# Patient Record
Sex: FEMALE | Race: WHITE | NOT HISPANIC OR LATINO | URBAN - METROPOLITAN AREA
[De-identification: names, ages, dates, MRNs, and addresses within clinical notes are randomized per-mention and may not be internally consistent; named-entity substitution may affect disease eponyms.]

---

## 2017-01-02 NOTE — PATIENT DISCUSSION
POAG, OU:  ELEVATED INTRAOCULAR PRESSURE. PRESCRIBE __LATANOPROST QHS OU_______. RETURN FOR FOLLOW-UP AS SCHEDULED.

## 2017-01-02 NOTE — PATIENT DISCUSSION
Primary Open Angle Glaucoma Counseling:  I have explained to the patient at length regarding the diagnosis of primary open angle glaucoma and its pathophysiology. I have discussed the various treatment options including medications and surgery. I recommend that the patient begin medical treatment. I emphasized to the patient the importance of compliance with treatment and follow-up appointments.

## 2017-04-27 ENCOUNTER — IMPORTED ENCOUNTER (OUTPATIENT)
Dept: URBAN - METROPOLITAN AREA CLINIC 9 | Facility: CLINIC | Age: 67
End: 2017-04-27

## 2017-08-29 ENCOUNTER — IMPORTED ENCOUNTER (OUTPATIENT)
Dept: URBAN - METROPOLITAN AREA CLINIC 9 | Facility: CLINIC | Age: 67
End: 2017-08-29

## 2018-03-06 ENCOUNTER — IMPORTED ENCOUNTER (OUTPATIENT)
Dept: URBAN - METROPOLITAN AREA CLINIC 9 | Facility: CLINIC | Age: 68
End: 2018-03-06

## 2018-08-07 ENCOUNTER — IMPORTED ENCOUNTER (OUTPATIENT)
Dept: URBAN - METROPOLITAN AREA CLINIC 9 | Facility: CLINIC | Age: 68
End: 2018-08-07

## 2018-09-20 NOTE — PATIENT DISCUSSION
At this point, I don't recommend any surgery for the removal of the floaters since this is her better seeing eye.

## 2018-09-21 ENCOUNTER — IMPORTED ENCOUNTER (OUTPATIENT)
Dept: URBAN - METROPOLITAN AREA CLINIC 9 | Facility: CLINIC | Age: 68
End: 2018-09-21

## 2019-03-22 ENCOUNTER — IMPORTED ENCOUNTER (OUTPATIENT)
Dept: URBAN - METROPOLITAN AREA CLINIC 9 | Facility: CLINIC | Age: 69
End: 2019-03-22

## 2019-04-08 ENCOUNTER — IMPORTED ENCOUNTER (OUTPATIENT)
Dept: URBAN - METROPOLITAN AREA CLINIC 9 | Facility: CLINIC | Age: 69
End: 2019-04-08

## 2019-04-26 ENCOUNTER — IMPORTED ENCOUNTER (OUTPATIENT)
Dept: URBAN - METROPOLITAN AREA CLINIC 9 | Facility: CLINIC | Age: 69
End: 2019-04-26

## 2019-05-06 ENCOUNTER — IMPORTED ENCOUNTER (OUTPATIENT)
Dept: URBAN - METROPOLITAN AREA CLINIC 9 | Facility: CLINIC | Age: 69
End: 2019-05-06

## 2019-05-10 ENCOUNTER — IMPORTED ENCOUNTER (OUTPATIENT)
Dept: URBAN - METROPOLITAN AREA CLINIC 9 | Facility: CLINIC | Age: 69
End: 2019-05-10

## 2019-05-22 ENCOUNTER — IMPORTED ENCOUNTER (OUTPATIENT)
Dept: URBAN - METROPOLITAN AREA CLINIC 9 | Facility: CLINIC | Age: 69
End: 2019-05-22

## 2019-08-14 ENCOUNTER — IMPORTED ENCOUNTER (OUTPATIENT)
Dept: URBAN - METROPOLITAN AREA CLINIC 9 | Facility: CLINIC | Age: 69
End: 2019-08-14

## 2019-12-26 ENCOUNTER — OFFICE VISIT (OUTPATIENT)
Dept: URGENT CARE | Facility: MEDICAL CENTER | Age: 69
End: 2019-12-26
Payer: MEDICARE

## 2019-12-26 VITALS
OXYGEN SATURATION: 96 % | TEMPERATURE: 100.6 F | BODY MASS INDEX: 27.09 KG/M2 | DIASTOLIC BLOOD PRESSURE: 72 MMHG | HEIGHT: 67 IN | RESPIRATION RATE: 22 BRPM | WEIGHT: 172.62 LBS | SYSTOLIC BLOOD PRESSURE: 157 MMHG | HEART RATE: 92 BPM

## 2019-12-26 DIAGNOSIS — J44.1 ACUTE EXACERBATION OF CHRONIC OBSTRUCTIVE PULMONARY DISEASE (COPD) (HCC): ICD-10-CM

## 2019-12-26 DIAGNOSIS — J06.9 ACUTE URI: Primary | ICD-10-CM

## 2019-12-26 PROCEDURE — 99214 OFFICE O/P EST MOD 30 MIN: CPT | Performed by: FAMILY MEDICINE

## 2019-12-26 PROCEDURE — G0463 HOSPITAL OUTPT CLINIC VISIT: HCPCS | Performed by: FAMILY MEDICINE

## 2019-12-26 RX ORDER — OMEPRAZOLE 40 MG/1
40 CAPSULE, DELAYED RELEASE ORAL DAILY
COMMUNITY

## 2019-12-26 RX ORDER — LEVOTHYROXINE SODIUM 88 UG/1
CAPSULE ORAL
COMMUNITY

## 2019-12-26 RX ORDER — GABAPENTIN 100 MG/1
100 CAPSULE ORAL 3 TIMES DAILY
COMMUNITY

## 2019-12-26 RX ORDER — BENZONATATE 200 MG/1
200 CAPSULE ORAL 3 TIMES DAILY PRN
Qty: 20 CAPSULE | Refills: 0 | Status: SHIPPED | OUTPATIENT
Start: 2019-12-26

## 2019-12-26 RX ORDER — ASCORBIC ACID 250 MG
TABLET,CHEWABLE ORAL
COMMUNITY

## 2019-12-26 RX ORDER — MELOXICAM 7.5 MG/1
7.5 TABLET ORAL DAILY
COMMUNITY

## 2019-12-26 RX ORDER — BUDESONIDE AND FORMOTEROL FUMARATE DIHYDRATE 160; 4.5 UG/1; UG/1
2 AEROSOL RESPIRATORY (INHALATION) 2 TIMES DAILY
COMMUNITY

## 2019-12-26 RX ORDER — PREDNISONE 20 MG/1
TABLET ORAL
Qty: 18 TABLET | Refills: 0 | Status: SHIPPED | OUTPATIENT
Start: 2019-12-26

## 2019-12-26 RX ORDER — CHOLECALCIFEROL (VITAMIN D3) 125 MCG
2000 CAPSULE ORAL DAILY
COMMUNITY

## 2019-12-26 RX ORDER — LORATADINE 10 MG/1
CAPSULE, LIQUID FILLED ORAL
COMMUNITY

## 2019-12-26 RX ORDER — CHOLECALCIFEROL (VITAMIN D3) 125 MCG
500 CAPSULE ORAL DAILY
COMMUNITY

## 2019-12-26 NOTE — PROGRESS NOTES
[facility]        NAME: Shannon Gomes is a 71 y o  female  : 1950    MRN: [id number]  DATE: 2019  TIME: 5:22 PM    Assessment and Plan   Acute URI [J06 9]  1  Acute URI  benzonatate (TESSALON) 200 MG capsule    predniSONE (DELTASONE) 20 mg tablet   2  Acute exacerbation of chronic obstructive pulmonary disease (COPD) (HCC)  benzonatate (TESSALON) 200 MG capsule    predniSONE (DELTASONE) 20 mg tablet         Patient Instructions       Follow up with PCP in 3-5 days  Proceed to  ER if symptoms worsen  Chief Complaint     Chief Complaint   Patient presents with    Cough     c/o coughing and chills since yesterday  Per Pt "I have COPD so it takes everything out of me "         History of Present Illness       51-year-old female here today with 1 day history URI symptoms  Symptoms consist of mild nasal congestion  Denies postnasal drip  Refers to scratchy throat which has improved  Her cough is predominant nonproductive but complaining shortness of breath and wheezing  She has a known history of COPD and currently uses Symbicort inhaler twice a day, has recently started using her Combivent rescue inhaler  For cold symptoms she has started using Mucinex day and nighttime relief along with NyQuil this morning  Symptoms seem slightly better  Denies any fever chills that she is aware of  Review of Systems   Review of Systems   Constitutional: Negative  HENT: Positive for congestion and sore throat  Respiratory: Positive for cough and shortness of breath  Neurological: Positive for headaches           Current Medications       Current Outpatient Medications:     Ascorbic Acid (VITAMIN C) 250 MG CHEW, Chew, Disp: , Rfl:     budesonide-formoterol (SYMBICORT) 160-4 5 mcg/act inhaler, Inhale 2 puffs 2 (two) times a day Rinse mouth after use , Disp: , Rfl:     Calcium-Magnesium-Vitamin D (CALCIUM 500 PO), Take by mouth, Disp: , Rfl:     Cholecalciferol (VITAMIN D3) 50 MCG (2000 UT) TABS, Take 2,000 Units by mouth daily, Disp: , Rfl:     ESCITALOPRAM OXALATE PO, Take by mouth, Disp: , Rfl:     FIBER SELECT GUMMIES PO, Take by mouth, Disp: , Rfl:     gabapentin (NEURONTIN) 100 mg capsule, Take 100 mg by mouth 3 (three) times a day, Disp: , Rfl:     ipratropium-albuterol (COMBIVENT RESPIMAT) inhaler, Inhale 1 puff 4 (four) times a day, Disp: , Rfl:     Levothyroxine Sodium 88 MCG CAPS, Take by mouth, Disp: , Rfl:     Loratadine 10 MG CAPS, Take by mouth, Disp: , Rfl:     meloxicam (MOBIC) 7 5 mg tablet, Take 7 5 mg by mouth daily, Disp: , Rfl:     Multiple Vitamins-Minerals (CENTRUM ADULTS PO), Take by mouth, Disp: , Rfl:     omeprazole (PriLOSEC) 40 MG capsule, Take 40 mg by mouth daily, Disp: , Rfl:     vitamin B-12 (VITAMIN B-12) 500 mcg tablet, Take 500 mcg by mouth daily, Disp: , Rfl:     benzonatate (TESSALON) 200 MG capsule, Take 1 capsule (200 mg total) by mouth 3 (three) times a day as needed for cough, Disp: 20 capsule, Rfl: 0    predniSONE (DELTASONE) 20 mg tablet, Take 3 tablets for 3 days then 2 tablets for 3 days then 1 tablet for 3 days then stop, Disp: 18 tablet, Rfl: 0    Current Allergies     Allergies as of 12/26/2019    (No Known Allergies)            The following portions of the patient's history were reviewed and updated as appropriate: allergies, current medications, past family history, past medical history, past social history, past surgical history and problem list      Past Medical History:   Diagnosis Date    Anxiety     Diverticulitis     GERD (gastroesophageal reflux disease)        Past Surgical History:   Procedure Laterality Date    COLON SURGERY      LUNG SURGERY         No family history on file  Medications have been verified          Objective   /72 (BP Location: Left arm, Patient Position: Sitting, Cuff Size: Extra-Large)   Pulse 92   Temp (!) 100 6 °F (38 1 °C) (Tympanic)   Resp 22   Ht 5' 7" (1 702 m)   Wt 78 3 kg (172 lb 9 9 oz)   SpO2 96%   BMI 27 04 kg/m²        Physical Exam     Physical Exam   HENT:   Mouth/Throat: Oropharynx is clear and moist    Erythematous hypertrophic left turbinates  Neck: Normal range of motion  Cardiovascular: Normal rate, regular rhythm and normal heart sounds  Pulmonary/Chest: Effort normal and breath sounds normal    Nursing note and vitals reviewed

## 2019-12-26 NOTE — PATIENT INSTRUCTIONS
I prescribed Tessalon Perles 200 mg q 8 hours, prednisone tapering from 60 down to 20 mg over 9 days  Encourage patient to continue Symbicort twice a day, use Combivent as needed  Recommended over-the-counter plain Mucinex for expectoration  Follow up with PCP symptoms persist or worsen  COPD, Ambulatory Care   GENERAL INFORMATION:   COPD (chronic obstructive pulmonary disease)  is a lung disease that makes it hard for you to breathe  COPD is usually a result of lung damage caused by years of irritation and inflammation  COPD limits air flow in your lungs  Smoking, pollution, genetics, or a history of lung infections can increase your risk for COPD  Common symptoms include the following:   · Shortness of breath     · A dry cough     · Coughing fits that bring up mucus from your lungs     · Wheezing and chest tightness  Seek immediate care for the following symptoms:   · Confusion, dizziness, or lightheadedness    · Red, swollen, warm arm or leg    · Shortness of breath or chest pain    · Coughing up blood  Treatment for COPD  may include medicines to help decrease swelling and inflammation in your lungs  Medicines may also help open your airways or treat and infection  You may need pulmonary rehabilitation to help you manage your symptoms and improve your quality of life  You may need extra oxygen to help you breathe easier  Manage COPD and prevent an exacerbation:   · Do not smoke, and avoid others who smoke  If you smoke, it is never too late to quit  You may have fewer exacerbations  Ask for information about medicines and support programs that can help you quit  · Avoid triggers that make your symptoms worse  Cold weather and sudden temperature changes can trigger an exacerbation  Fumes from cars and chemicals, air pollution, and perfume can also increase your symptoms  · Use pursed-lip breathing when you feel short of breath  Take a deep breath in through your nose   Slowly breathe out through your mouth with your lips pursed for twice as long as you inhaled  You can also practice this breathing pattern while you bend, lift, climb stairs, or exercise  Pursed-lip breathing slows down your breathing and helps move more air in and out of your lungs  · Exercise for at least 20 minutes each day  Exercise can help increase your energy and decrease shortness of breath  Ask about the best exercise plan for you  · Prevent infections that can be dangerous when you have COPD  Get a flu vaccine every year as soon as it becomes available  Ask if you should also get other vaccines, such as those given to prevent pneumonia and tetanus  Avoid people who are sick, and wash your hands often  Follow up with your healthcare provider as directed:  Write down your questions so you remember to ask them during your visits  CARE AGREEMENT:   You have the right to help plan your care  Learn about your health condition and how it may be treated  Discuss treatment options with your caregivers to decide what care you want to receive  You always have the right to refuse treatment  The above information is an  only  It is not intended as medical advice for individual conditions or treatments  Talk to your doctor, nurse or pharmacist before following any medical regimen to see if it is safe and effective for you  © 2014 [address] is for End User's use only and may not be sold, redistributed or otherwise used for commercial purposes  All illustrations and images included in CareNotes® are the copyrighted property of [employer]  or Jarvis Troncoso

## 2020-07-08 ENCOUNTER — IMPORTED ENCOUNTER (OUTPATIENT)
Dept: URBAN - METROPOLITAN AREA CLINIC 9 | Facility: CLINIC | Age: 70
End: 2020-07-08

## 2020-09-15 ENCOUNTER — IMPORTED ENCOUNTER (OUTPATIENT)
Dept: URBAN - METROPOLITAN AREA CLINIC 9 | Facility: CLINIC | Age: 70
End: 2020-09-15

## 2021-01-19 NOTE — PATIENT DISCUSSION
POAG, OU: IOP NOT AT TARGET, OU. R/B/A TX OPTIONS DISCUSSED WITH PATIENT. RESTART TIMOLOL BID OU.  SCHEDULE SLT OD THEN OS

## 2021-01-25 ENCOUNTER — IMPORTED ENCOUNTER (OUTPATIENT)
Dept: URBAN - METROPOLITAN AREA CLINIC 9 | Facility: CLINIC | Age: 71
End: 2021-01-25

## 2021-02-23 ENCOUNTER — IMPORTED ENCOUNTER (OUTPATIENT)
Dept: URBAN - METROPOLITAN AREA CLINIC 9 | Facility: CLINIC | Age: 71
End: 2021-02-23

## 2021-03-17 ENCOUNTER — IMPORTED ENCOUNTER (OUTPATIENT)
Dept: URBAN - METROPOLITAN AREA CLINIC 9 | Facility: CLINIC | Age: 71
End: 2021-03-17

## 2021-04-01 ENCOUNTER — IMPORTED ENCOUNTER (OUTPATIENT)
Dept: URBAN - METROPOLITAN AREA CLINIC 9 | Facility: CLINIC | Age: 71
End: 2021-04-01

## 2021-05-04 ENCOUNTER — IMPORTED ENCOUNTER (OUTPATIENT)
Dept: URBAN - METROPOLITAN AREA CLINIC 9 | Facility: CLINIC | Age: 71
End: 2021-05-04

## 2021-06-08 ENCOUNTER — IMPORTED ENCOUNTER (OUTPATIENT)
Dept: URBAN - METROPOLITAN AREA CLINIC 9 | Facility: CLINIC | Age: 71
End: 2021-06-08

## 2021-06-15 ENCOUNTER — IMPORTED ENCOUNTER (OUTPATIENT)
Dept: URBAN - METROPOLITAN AREA CLINIC 9 | Facility: CLINIC | Age: 71
End: 2021-06-15

## 2021-09-17 ENCOUNTER — IMPORTED ENCOUNTER (OUTPATIENT)
Dept: URBAN - METROPOLITAN AREA CLINIC 9 | Facility: CLINIC | Age: 71
End: 2021-09-17

## 2021-09-17 PROBLEM — H40.013: Noted: 2021-09-17

## 2021-10-18 ENCOUNTER — PREPPED CHART (OUTPATIENT)
Dept: URBAN - METROPOLITAN AREA CLINIC 17 | Facility: CLINIC | Age: 71
End: 2021-10-18

## 2021-10-19 ASSESSMENT — KERATOMETRY
OS_K1POWER_DIOPTERS: 45.25
OS_AXISANGLE_DEGREES: 174
OD_AXISANGLE_DEGREES: 157
OD_K2POWER_DIOPTERS: 45.75
OS_K2POWER_DIOPTERS: 46.75
OS_AXISANGLE2_DEGREES: 84
OD_K1POWER_DIOPTERS: 44.25
OD_AXISANGLE2_DEGREES: 67

## 2021-10-19 ASSESSMENT — VISUAL ACUITY
OD_CC: 20/25 SN
OS_CC: 20/25 -2 SN
OS_CC: 20/30 - SN
OD_SC: 20/20 SN
OS_CC: 20/25 -2 SN
OD_CC: 20/20 SN
OD_CC: 20/20 - SN
OD_CC: 20/25 -2 SN
OS_CC: 20/20 - SN
OS_CC: 20/20 SN
OD_SC: 20/25 SN
OD_SC: 20/25 + SN
OS_SC: 20/25 SN
OD_CC: 20/25 SN
OS_CC: 20/25 SN
OS_CC: 20/20 SN
OD_CC: 20/20 - SN
OS_CC: 20/30 - SN
OD_CC: 20/25 SN
OS_SC: 20/30 SN
OD_CC: 20/25 -2 SN
OS_CC: 20/25 -2 SN
OS_SC: 20/25 + SN
OS_SC: 20/30 -2 SN
OD_CC: 20/20 SN
OD_CC: 20/25 -2 SN
OD_CC: 20/25 - SN
OS_SC: 20/30 +2 SN
OD_SC: 20/20 - SN
OS_CC: 20/20 - SN

## 2021-10-19 ASSESSMENT — TONOMETRY
OS_IOP_MMHG: 19
OS_IOP_MMHG: 19
OD_IOP_MMHG: 17
OS_IOP_MMHG: 17
OS_IOP_MMHG: 18
OS_IOP_MMHG: 12
OS_IOP_MMHG: 15
OD_IOP_MMHG: 13
OS_IOP_MMHG: 25
OS_IOP_MMHG: 19
OS_IOP_MMHG: 16
OD_IOP_MMHG: 18
OD_IOP_MMHG: 19
OD_IOP_MMHG: 19
OD_IOP_MMHG: 16
OD_IOP_MMHG: 28
OD_IOP_MMHG: 18
OS_IOP_MMHG: 14
OD_IOP_MMHG: 26
OS_IOP_MMHG: 26
OD_IOP_MMHG: 19
OS_IOP_MMHG: 18
OD_IOP_MMHG: 14
OD_IOP_MMHG: 17
OS_IOP_MMHG: 18
OD_IOP_MMHG: 17
OS_IOP_MMHG: 18
OD_IOP_MMHG: 20
OD_IOP_MMHG: 18

## 2021-10-19 ASSESSMENT — PACHYMETRY
OD_CT_UM: 568.0
OS_CT_UM: 548.0

## 2022-01-07 ENCOUNTER — COMPREHENSIVE EXAM (OUTPATIENT)
Dept: URBAN - METROPOLITAN AREA CLINIC 17 | Facility: CLINIC | Age: 72
End: 2022-01-07

## 2022-01-07 DIAGNOSIS — H35.372: ICD-10-CM

## 2022-01-07 DIAGNOSIS — H40.013: ICD-10-CM

## 2022-01-07 DIAGNOSIS — H43.813: ICD-10-CM

## 2022-01-07 PROCEDURE — 92134 CPTRZ OPH DX IMG PST SGM RTA: CPT

## 2022-01-07 PROCEDURE — 92015 DETERMINE REFRACTIVE STATE: CPT

## 2022-01-07 PROCEDURE — 92014 COMPRE OPH EXAM EST PT 1/>: CPT

## 2022-01-07 ASSESSMENT — VISUAL ACUITY
OD_SC: 20/25
OS_SC: 20/25

## 2022-01-07 ASSESSMENT — TONOMETRY
OS_IOP_MMHG: 17
OD_IOP_MMHG: 17

## 2022-02-28 NOTE — PATIENT DISCUSSION
Elevated IOP on Timolol BID OU. Discussed switching to Combigan vs adding SLT OD then OS. Pt wishes to switch gtts. Start Combigan BID OU.

## 2022-05-23 NOTE — PATIENT DISCUSSION
INTERVENTION AND SURGERY: The goal of glaucoma surgery is to lower intraocular pressure and prevent further damage to the optic nerve which causes irreversible vision loss. Discussed the options of minimally invasive glaucoma surgery including but not limited to SLT, goniotomy, cyclophotocoagulation, istent, and intracameral slow release implant of glaucoma medicine. SLT is a laser procedure to help improve outflow through the trabecular meshwork and lower pressure. A goniotomy is a micro-invasive glaucoma surgery performed by removing a section of trabecular tissue giving aqueous direct access to the  channels and the distal outflow system. This is the same target for istent implantation. The istent works by stenting open access to the trabecular outflow system. Cyclophotocoagulation directly targets aqueous production by laser treatment to prevent further spikes in pressure. Implantation of slow release glaucoma medicine has been shown to provide improvement in pressure control. None of these procedures are guaranteed to stop vision loss. Glaucoma is a progressive disease and further treatments will likely be necessary to maintain control of intraocular pressure.

## 2022-05-23 NOTE — PROCEDURE NOTE: CLINICAL
PROCEDURE NOTE: SLT OS. Diagnosis: POAG, Moderate. Anesthesia: Topical. Prep: Alphagan 0.15%. Prior to treatment, the risks/benefits/alternatives were discussed. The patient wished to proceed with procedure. Lens:  SLT laser lens with goniosol. Power: 1.0mJ. Total applications: 80. Application 601 Degrees. Patient tolerated procedure well. There were no complications. Post-op instructions given. Post-op IOP = 27 mmHg. Debi Driver

## 2022-05-23 NOTE — PATIENT DISCUSSION
Intolerant of Combigan BID OU and vyzulta, continue timolol BID OU, add lumigan OU QHS. Stressed compliance with treatment. High risk of vision loss due to cupping and elevated IOP. SLT TODAY.

## 2022-06-06 NOTE — PATIENT DISCUSSION
Intolerant of Combigan BID OU and vyzulta, continue timolol BID OU, add lumigan OU QHS (Pt c/o redness and irritation). Stressed compliance with treatment. High risk of vision loss due to cupping and elevated IOP. SLT TODAY.

## 2022-06-27 NOTE — PATIENT DISCUSSION
s/p SLT OU. Intolerant of Combigan, vyzulta and Lumigan (Pt c/o redness and irritation). Continue timolol BID OU. Stressed compliance with treatment. High risk of vision loss due to cupping and elevated IOP.

## 2022-07-18 ENCOUNTER — FOLLOW UP (OUTPATIENT)
Dept: URBAN - METROPOLITAN AREA CLINIC 17 | Facility: CLINIC | Age: 72
End: 2022-07-18

## 2022-07-18 DIAGNOSIS — H40.013: ICD-10-CM

## 2022-07-18 DIAGNOSIS — H04.123: ICD-10-CM

## 2022-07-18 PROCEDURE — 99214 OFFICE O/P EST MOD 30 MIN: CPT

## 2022-07-18 PROCEDURE — 92133 CPTRZD OPH DX IMG PST SGM ON: CPT

## 2022-07-18 RX ORDER — LOTEPREDNOL ETABONATE 3.8 MG/G: 1 GEL OPHTHALMIC

## 2022-07-18 ASSESSMENT — TONOMETRY
OD_IOP_MMHG: 16
OS_IOP_MMHG: 17

## 2022-07-18 ASSESSMENT — VISUAL ACUITY
OS_CC: 20/20-1
OD_CC: 20/20-1

## 2022-08-09 ENCOUNTER — ESTABLISHED PATIENT (OUTPATIENT)
Dept: URBAN - METROPOLITAN AREA CLINIC 17 | Facility: CLINIC | Age: 72
End: 2022-08-09

## 2022-08-09 DIAGNOSIS — H35.372: ICD-10-CM

## 2022-08-09 DIAGNOSIS — H40.013: ICD-10-CM

## 2022-08-09 DIAGNOSIS — H04.123: ICD-10-CM

## 2022-08-09 PROCEDURE — 92134 CPTRZ OPH DX IMG PST SGM RTA: CPT

## 2022-08-09 PROCEDURE — 99214 OFFICE O/P EST MOD 30 MIN: CPT

## 2022-08-09 ASSESSMENT — VISUAL ACUITY
OD_CC: 20/25+1
OS_CC: 20/30
OU_CC: 20/20

## 2022-08-09 ASSESSMENT — TONOMETRY
OS_IOP_MMHG: 13
OD_IOP_MMHG: 15

## 2022-09-06 ENCOUNTER — DIAGNOSTICS ONLY (OUTPATIENT)
Dept: URBAN - METROPOLITAN AREA CLINIC 17 | Facility: CLINIC | Age: 72
End: 2022-09-06

## 2022-09-06 DIAGNOSIS — H40.013: ICD-10-CM

## 2022-09-06 PROCEDURE — 92083 EXTENDED VISUAL FIELD XM: CPT

## 2022-09-29 ENCOUNTER — EMERGENCY VISIT (OUTPATIENT)
Dept: URBAN - METROPOLITAN AREA CLINIC 17 | Facility: CLINIC | Age: 72
End: 2022-09-29

## 2022-09-29 DIAGNOSIS — H43.813: ICD-10-CM

## 2022-09-29 DIAGNOSIS — H35.372: ICD-10-CM

## 2022-09-29 DIAGNOSIS — H52.4: ICD-10-CM

## 2022-09-29 PROCEDURE — 99214 OFFICE O/P EST MOD 30 MIN: CPT

## 2022-09-29 PROCEDURE — 92015 DETERMINE REFRACTIVE STATE: CPT

## 2022-09-29 PROCEDURE — 92134 CPTRZ OPH DX IMG PST SGM RTA: CPT

## 2022-09-29 ASSESSMENT — TONOMETRY
OD_IOP_MMHG: 13
OS_IOP_MMHG: 11

## 2022-09-29 ASSESSMENT — VISUAL ACUITY
OS_CC: 20/200
OS_PH: 20/40-2
OD_CC: 20/30

## 2022-10-07 ENCOUNTER — FOLLOW UP (OUTPATIENT)
Dept: URBAN - METROPOLITAN AREA CLINIC 17 | Facility: CLINIC | Age: 72
End: 2022-10-07

## 2022-10-07 DIAGNOSIS — H16.142: ICD-10-CM

## 2022-10-07 PROCEDURE — 99213 OFFICE O/P EST LOW 20 MIN: CPT

## 2022-10-07 ASSESSMENT — TONOMETRY
OD_IOP_MMHG: 15
OS_IOP_MMHG: 16

## 2022-10-07 ASSESSMENT — VISUAL ACUITY
OU_CC: 20/30
OS_CC: 20/100
OD_CC: 20/30

## 2023-01-17 ENCOUNTER — FOLLOW UP (OUTPATIENT)
Dept: URBAN - METROPOLITAN AREA CLINIC 17 | Facility: CLINIC | Age: 73
End: 2023-01-17

## 2023-01-17 DIAGNOSIS — H04.123: ICD-10-CM

## 2023-01-17 DIAGNOSIS — H16.142: ICD-10-CM

## 2023-01-17 PROCEDURE — 99213 OFFICE O/P EST LOW 20 MIN: CPT

## 2023-01-17 ASSESSMENT — VISUAL ACUITY
OU_CC: 20/25
OS_CC: 20/40
OD_CC: 20/25

## 2023-01-17 ASSESSMENT — TONOMETRY
OS_IOP_MMHG: 16
OD_IOP_MMHG: 15

## 2023-01-17 NOTE — PATIENT DISCUSSION
s/p SLT OU. Intolerant of Combigan, vyzulta and Lumigan (Pt c/o redness and irritation). Change timolol 0.25 QDAY to Timolol 0.5% BID OU Stressed compliance with treatment. High risk of vision loss due to cupping and elevated IOP.

## 2023-01-31 ENCOUNTER — FOLLOW UP (OUTPATIENT)
Dept: URBAN - METROPOLITAN AREA CLINIC 17 | Facility: CLINIC | Age: 73
End: 2023-01-31

## 2023-01-31 DIAGNOSIS — H16.142: ICD-10-CM

## 2023-01-31 DIAGNOSIS — H04.123: ICD-10-CM

## 2023-01-31 PROCEDURE — 99213 OFFICE O/P EST LOW 20 MIN: CPT

## 2023-01-31 ASSESSMENT — VISUAL ACUITY
OS_CC: 20/40
OU_CC: 20/25
OD_CC: 20/25

## 2023-01-31 ASSESSMENT — TONOMETRY
OS_IOP_MMHG: 15
OD_IOP_MMHG: 14

## 2023-05-15 ENCOUNTER — ESTABLISHED PATIENT (OUTPATIENT)
Dept: URBAN - METROPOLITAN AREA CLINIC 17 | Facility: CLINIC | Age: 73
End: 2023-05-15

## 2023-05-15 DIAGNOSIS — H52.4: ICD-10-CM

## 2023-05-15 DIAGNOSIS — H16.142: ICD-10-CM

## 2023-05-15 DIAGNOSIS — H35.372: ICD-10-CM

## 2023-05-15 DIAGNOSIS — H43.813: ICD-10-CM

## 2023-05-15 DIAGNOSIS — H40.013: ICD-10-CM

## 2023-05-15 DIAGNOSIS — H04.123: ICD-10-CM

## 2023-05-15 PROCEDURE — 92134 CPTRZ OPH DX IMG PST SGM RTA: CPT

## 2023-05-15 PROCEDURE — 99214 OFFICE O/P EST MOD 30 MIN: CPT

## 2023-05-15 ASSESSMENT — VISUAL ACUITY
OD_CC: 20/20
OS_CC: 20/30-2

## 2023-05-15 ASSESSMENT — TONOMETRY
OS_IOP_MMHG: 12
OD_IOP_MMHG: 16

## 2023-09-15 ENCOUNTER — DIAGNOSTICS ONLY (OUTPATIENT)
Dept: URBAN - METROPOLITAN AREA CLINIC 17 | Facility: CLINIC | Age: 73
End: 2023-09-15

## 2023-09-15 DIAGNOSIS — H40.013: ICD-10-CM

## 2023-09-15 PROCEDURE — 92133 CPTRZD OPH DX IMG PST SGM ON: CPT

## 2023-09-15 PROCEDURE — 92083 EXTENDED VISUAL FIELD XM: CPT

## 2023-09-20 ENCOUNTER — EMERGENCY VISIT (OUTPATIENT)
Dept: URBAN - METROPOLITAN AREA CLINIC 17 | Facility: CLINIC | Age: 73
End: 2023-09-20

## 2023-09-20 DIAGNOSIS — H04.123: ICD-10-CM

## 2023-09-20 PROCEDURE — 99213 OFFICE O/P EST LOW 20 MIN: CPT

## 2023-09-20 RX ORDER — ERYTHROMYCIN 5 MG/G: OINTMENT OPHTHALMIC

## 2023-09-20 RX ORDER — TOBRAMYCIN AND DEXAMETHASONE 1; 3 MG/ML; MG/ML: 1 SUSPENSION/ DROPS OPHTHALMIC

## 2023-09-20 ASSESSMENT — TONOMETRY
OD_IOP_MMHG: 14
OS_IOP_MMHG: 15

## 2023-09-20 ASSESSMENT — VISUAL ACUITY
OS_CC: 20/25-2
OD_CC: 20/30+1

## 2023-10-04 ENCOUNTER — FOLLOW UP (OUTPATIENT)
Dept: URBAN - METROPOLITAN AREA CLINIC 17 | Facility: CLINIC | Age: 73
End: 2023-10-04

## 2023-10-04 DIAGNOSIS — H01.023: ICD-10-CM

## 2023-10-04 DIAGNOSIS — H01.026: ICD-10-CM

## 2023-10-04 DIAGNOSIS — H10.522: ICD-10-CM

## 2023-10-04 DIAGNOSIS — H04.123: ICD-10-CM

## 2023-10-04 PROCEDURE — 99213 OFFICE O/P EST LOW 20 MIN: CPT

## 2023-10-04 RX ORDER — NEOMYCIN SULFATE, POLYMYXIN B SULFATE AND DEXAMETHASONE 3.5; 10000; 1 MG/G; [USP'U]/G; MG/G: OINTMENT OPHTHALMIC TWICE A DAY

## 2023-10-04 ASSESSMENT — TONOMETRY
OS_IOP_MMHG: 16
OD_IOP_MMHG: 14

## 2023-10-04 ASSESSMENT — VISUAL ACUITY
OS_CC: 20/40-2
OS_PH: 20/30-1
OD_CC: 20/20-2

## 2023-10-17 ENCOUNTER — FOLLOW UP (OUTPATIENT)
Dept: URBAN - METROPOLITAN AREA CLINIC 17 | Facility: CLINIC | Age: 73
End: 2023-10-17

## 2023-10-17 DIAGNOSIS — H01.023: ICD-10-CM

## 2023-10-17 DIAGNOSIS — H35.372: ICD-10-CM

## 2023-10-17 DIAGNOSIS — H01.026: ICD-10-CM

## 2023-10-17 DIAGNOSIS — H10.522: ICD-10-CM

## 2023-10-17 DIAGNOSIS — H04.123: ICD-10-CM

## 2023-10-17 DIAGNOSIS — H16.142: ICD-10-CM

## 2023-10-17 PROCEDURE — 99213 OFFICE O/P EST LOW 20 MIN: CPT

## 2023-10-17 PROCEDURE — 92134 CPTRZ OPH DX IMG PST SGM RTA: CPT

## 2023-10-17 RX ORDER — FLUOROMETHOLONE 1 MG/ML: 1 SUSPENSION/ DROPS OPHTHALMIC

## 2023-10-17 ASSESSMENT — VISUAL ACUITY
OD_CC: 20/20-2
OS_CC: 20/200
OS_PH: 20/50

## 2023-10-24 ENCOUNTER — FOLLOW UP (OUTPATIENT)
Dept: URBAN - METROPOLITAN AREA CLINIC 17 | Facility: CLINIC | Age: 73
End: 2023-10-24

## 2023-10-24 DIAGNOSIS — H04.123: ICD-10-CM

## 2023-10-24 DIAGNOSIS — H16.142: ICD-10-CM

## 2023-10-24 DIAGNOSIS — H01.023: ICD-10-CM

## 2023-10-24 DIAGNOSIS — H01.026: ICD-10-CM

## 2023-10-24 DIAGNOSIS — H18.832: ICD-10-CM

## 2023-10-24 PROCEDURE — 99213 OFFICE O/P EST LOW 20 MIN: CPT

## 2023-10-24 ASSESSMENT — TONOMETRY
OD_IOP_MMHG: 14
OS_IOP_MMHG: 15

## 2023-10-24 ASSESSMENT — VISUAL ACUITY
OS_PH: 20/40
OD_CC: 20/20
OS_CC: 20/60-2